# Patient Record
Sex: FEMALE | URBAN - METROPOLITAN AREA
[De-identification: names, ages, dates, MRNs, and addresses within clinical notes are randomized per-mention and may not be internally consistent; named-entity substitution may affect disease eponyms.]

---

## 2024-11-19 ENCOUNTER — TELEPHONE (OUTPATIENT)
Age: 74
End: 2024-11-19

## 2024-11-19 NOTE — TELEPHONE ENCOUNTER
Patient called, she was injured in [place] on 10/29/2024. She has a Left Shoulder Fx and was told it was non-surgical but that she will need therapy. She is still in a sling but is back home in [place] and is looking for an Ortho doctor to see here, preferably in [place]. She has some records from the ED in [place] and the images on a disc that she can bring with her.    Please review and advise patient, [phone]

## 2024-12-10 ENCOUNTER — OFFICE VISIT (OUTPATIENT)
Age: 74
End: 2024-12-10
Payer: MEDICARE

## 2024-12-10 VITALS — RESPIRATION RATE: 20 BRPM | HEIGHT: 62 IN | BODY MASS INDEX: 25.4 KG/M2 | WEIGHT: 138 LBS

## 2024-12-10 DIAGNOSIS — M25.512 LEFT SHOULDER PAIN, UNSPECIFIED CHRONICITY: Primary | ICD-10-CM

## 2024-12-10 DIAGNOSIS — S42.292A OTHER CLOSED DISPLACED FRACTURE OF PROXIMAL END OF LEFT HUMERUS, INITIAL ENCOUNTER: ICD-10-CM

## 2024-12-10 PROCEDURE — G8400 PT W/DXA NO RESULTS DOC: HCPCS | Performed by: ORTHOPAEDIC SURGERY

## 2024-12-10 PROCEDURE — 1036F TOBACCO NON-USER: CPT | Performed by: ORTHOPAEDIC SURGERY

## 2024-12-10 PROCEDURE — 3017F COLORECTAL CA SCREEN DOC REV: CPT | Performed by: ORTHOPAEDIC SURGERY

## 2024-12-10 PROCEDURE — G8427 DOCREV CUR MEDS BY ELIG CLIN: HCPCS | Performed by: ORTHOPAEDIC SURGERY

## 2024-12-10 PROCEDURE — 1125F AMNT PAIN NOTED PAIN PRSNT: CPT | Performed by: ORTHOPAEDIC SURGERY

## 2024-12-10 PROCEDURE — 1123F ACP DISCUSS/DSCN MKR DOCD: CPT | Performed by: ORTHOPAEDIC SURGERY

## 2024-12-10 PROCEDURE — 73030 X-RAY EXAM OF SHOULDER: CPT | Performed by: ORTHOPAEDIC SURGERY

## 2024-12-10 PROCEDURE — G8419 CALC BMI OUT NRM PARAM NOF/U: HCPCS | Performed by: ORTHOPAEDIC SURGERY

## 2024-12-10 PROCEDURE — G8484 FLU IMMUNIZE NO ADMIN: HCPCS | Performed by: ORTHOPAEDIC SURGERY

## 2024-12-10 PROCEDURE — 1159F MED LIST DOCD IN RCRD: CPT | Performed by: ORTHOPAEDIC SURGERY

## 2024-12-10 PROCEDURE — 99203 OFFICE O/P NEW LOW 30 MIN: CPT | Performed by: ORTHOPAEDIC SURGERY

## 2024-12-10 PROCEDURE — 1090F PRES/ABSN URINE INCON ASSESS: CPT | Performed by: ORTHOPAEDIC SURGERY

## 2024-12-10 PROCEDURE — 1160F RVW MEDS BY RX/DR IN RCRD: CPT | Performed by: ORTHOPAEDIC SURGERY

## 2024-12-10 NOTE — PROGRESS NOTES
Joanne Mills  1950   Chief Complaint   Patient presents with    Shoulder Pain     Left fx        HISTORY OF PRESENT ILLNESS  Joanne Mills is a 74 y.o. female who presents today for evaluation of left shoulder pain.  Pain is a 7/10. Pain has been present for a month and a half. She notes she slipped and fell in October 29th when she was in [place]. She notes her shoulder was not dislocated.  She has aching at times. She does not take any medication for the pain. She is wearing a sling. She engages in exercises to maintain mobility. Having little pain with movement.    Has tried following treatments: Injections:No; Brace:Yes; Therapy:No; Cane/Crutch:No      Not on File     History reviewed. No pertinent past medical history.   Social History    None        History reviewed. No pertinent surgical history.   History reviewed. No pertinent family history.  No current outpatient medications on file.     No current facility-administered medications for this visit.       REVIEW OF SYSTEM   Patient denies: Weight loss, Fever/Chills, HA, Visual changes, Fatigue, Chest pain, SOB, Abdominal pain, N/V/D/C, Blood in stool or urine, Edema.   Pertinent positive as above in HPI. All others were negative    PHYSICAL EXAM:   Resp 20   Ht 1.575 m (5' 2\")   Wt 62.6 kg (138 lb)   BMI 25.24 kg/m²   The patient is a well-developed, well-nourished female   in no acute distress.  The patient is alert and oriented times three.  The patient is alert and oriented times three. Mood and affect are normal.  LYMPHATIC: lymph nodes are not enlarged and are within normal limits  SKIN: normal in color and non tender to palpation. There are no bruises or abrasions noted.   NEUROLOGICAL: Motor sensory exam is within normal limits. Reflexes are equal bilaterally. There is normal sensation to pinprick and light touch  MUSCULOSKELETAL: Inspection mild swelling of the left shoulder with restricted range of motion glenohumeral

## 2024-12-11 ENCOUNTER — TELEPHONE (OUTPATIENT)
Age: 74
End: 2024-12-11

## 2024-12-11 DIAGNOSIS — M25.512 LEFT SHOULDER PAIN, UNSPECIFIED CHRONICITY: Primary | ICD-10-CM

## 2024-12-11 RX ORDER — CELECOXIB 200 MG/1
200 CAPSULE ORAL DAILY
Qty: 60 CAPSULE | Refills: 3 | Status: SHIPPED | OUTPATIENT
Start: 2024-12-11

## 2024-12-11 NOTE — TELEPHONE ENCOUNTER
Patient called in and ask if something for her painful shoulders be sent into her pharmacy    Please advise patient @ [phone]

## 2024-12-13 ENCOUNTER — HOSPITAL ENCOUNTER (OUTPATIENT)
Facility: HOSPITAL | Age: 74
Setting detail: RECURRING SERIES
Discharge: HOME | End: 2024-12-16
Payer: MEDICARE

## 2024-12-13 PROCEDURE — 97110 THERAPEUTIC EXERCISES: CPT

## 2024-12-13 PROCEDURE — 97535 SELF CARE MNGMENT TRAINING: CPT

## 2024-12-13 PROCEDURE — 97161 PT EVAL LOW COMPLEX 20 MIN: CPT

## 2024-12-13 NOTE — PROGRESS NOTES
PHYSICAL / OCCUPATIONAL THERAPY - DAILY TREATMENT NOTE (updated )    Patient Name: Joanne Mills    Date: 2024    : 1950  Insurance: Payor: MEDICARE / Plan: MEDICARE PART A AND B / Product Type: *No Product type* /      Patient  verified Yes     Visit #   Current / Total 1 24   Time   In / Out 9:40 10:20   Pain   In / Out 4 4   Subjective Functional Status/Changes: See POC     TREATMENT AREA =  Left shoulder pain [M25.512]  Other displaced fracture of upper end of left humerus, initial encounter for closed fracture [S42.292A]    OBJECTIVE    16 min   Eval - untimed                      Therapeutic Procedures:  Tx Min Billable or 1:1 Min (if diff from Tx Min) Procedure, Rationale, Specifics   12  58252 Therapeutic Exercise (timed):  increase ROM, strength, coordination, balance, and proprioception to improve patient's ability to progress to PLOF and address remaining functional goals. (see flow sheet as applicable)     Details if applicable:  HEP instruction and demonstration, PROM left shoulder flex/ABD/ER with oscillations and gentle overpressure in supine     12  70013 Self Care/Home Management (timed):  improve patient knowledge and understanding of home injury/symptom/pain management, positioning, posture/ergonomics, home safety, activity modification, transfer techniques, and joint protection strategies  to improve patient's ability to progress to PLOF and address remaining functional goals.  (see flow sheet as applicable)     Details if applicable:  pt education on relevant anatomy/physiology, pt education on ROM restrictions per physician order   24  [facility] Totals Reminder: bill using total billable min of TIMED therapeutic procedures (example: do not include dry needle or estim unattended, both untimed codes, in totals to left)  8-22 min = 1 unit; 23-37 min = 2 units; 38-52 min = 3 units; 53-67 min = 4 units; 68-82 min = 5 units   Total Total     TOTAL TREATMENT TIME:        40     [x]

## 2024-12-13 NOTE — PROGRESS NOTES
[facility] AT [facility]  [address] [place] [address] Phone: [phone] Fax [phone]  Plan of Care / Statement of Necessity for Physical Therapy Services     Patient Name: oJanne Mills : 1950   Treatment   Diagnosis: M25.512  LEFT SHOULDER PAIN Medical Diagnosis: Left shoulder pain [M25.512]  Other displaced fracture of upper end of left humerus, initial encounter for closed fracture [S42.292A]   Onset Date: 10/29/2024 Payor Source: Payor: MEDICARE / Plan: MEDICARE PART A AND B / Product Type: *No Product type* /    Referral Source: Mumtaz Jaime,* Start of Care (SOC): 2024   Prior Hospitalization: See medical history Provider #: [id number]   Prior Level of Function: Independent with ADLs, functional, and daily activities with no left UE pain.    Comorbidities: Musculoskeletal disorders and Other: arthritis, pre-DM, HTN, osteoporosis      Assessment / key information:    Pt is a 74 year old female who presents to therapy today with left shoulder pain. Pt states that her symptoms began on 10/29/2024 when she slipped and fell at a [employer] in [place]. She reports going to the ED after the incident, had xrays performed, which showed a proximal left humerus fracture. Pt reports having pain with lifting the left UE. She reports needing intermittent assistance from her  for IADLs and is unable to drive. She has difficulty sleeping and is unable to lay in her bed because of the pain. Pt demonstrated decreased AROM/PROM, impaired posture, and muscle tightness/tenderness to palpation. Pt would benefit from skilled physical therapy to improve the above impairments to help the pt restore function and return to performing ADLs, functional, and daily activities.     Evaluation Complexity:  History:  HIGH Complexity :3+ comorbidities / personal factors will impact the outcome/ POC ; Examination:  MEDIUM Complexity

## 2024-12-16 ENCOUNTER — HOSPITAL ENCOUNTER (OUTPATIENT)
Facility: HOSPITAL | Age: 74
Setting detail: RECURRING SERIES
Discharge: HOME | End: 2024-12-19
Payer: MEDICARE

## 2024-12-16 PROCEDURE — 97110 THERAPEUTIC EXERCISES: CPT

## 2024-12-16 PROCEDURE — 97140 MANUAL THERAPY 1/> REGIONS: CPT

## 2024-12-16 NOTE — PROGRESS NOTES
PHYSICAL / OCCUPATIONAL THERAPY - DAILY TREATMENT NOTE (updated )    Patient Name: Joanne Mills    Date: 2024    : 1950  Insurance: Payor: MEDICARE / Plan: MEDICARE PART A AND B / Product Type: *No Product type* /      Patient  verified Yes     Visit #   Current / Total 2 24   Time   In / Out 10:23 11:02   Pain   In / Out 5 5   Subjective Functional Status/Changes: Pt reports attempting HEP but does have some questions with the exercises.      TREATMENT AREA =  Left shoulder pain [M25.512]  Other displaced fracture of upper end of left humerus, initial encounter for closed fracture [S42.292A]    OBJECTIVE  Therapeutic Procedures:  Tx Min Billable or 1:1 Min (if diff from Tx Min) Procedure, Rationale, Specifics   27  45390 Therapeutic Exercise (timed):  increase ROM, strength, coordination, balance, and proprioception to improve patient's ability to progress to PLOF and address remaining functional goals. (see flow sheet as applicable)     Details if applicable:  exercises, HEP instruction     12  03626 Manual Therapy (timed):  decrease pain, increase ROM, increase tissue extensibility, and increase postural awareness to improve patient's ability to progress to PLOF and address remaining functional goals.  The manual therapy interventions were performed at a separate and distinct time from the therapeutic activities interventions . (see flow sheet as applicable)     Details if applicable: in supine: gentle left GHJ distraction, grade 2 left GHJ posterior mobs, PROM left shoulder flex/ABD/IR/ER with oscillations and gentle overpressure after performing above.    39  [facility] Totals Reminder: bill using total billable min of TIMED therapeutic procedures (example: do not include dry needle or estim unattended, both untimed codes, in totals to left)  8-22 min = 1 unit; 23-37 min = 2 units; 38-52 min = 3 units; 53-67 min = 4 units; 68-82 min = 5 units   Total Total     TOTAL TREATMENT TIME:        39

## 2024-12-18 ENCOUNTER — HOSPITAL ENCOUNTER (OUTPATIENT)
Facility: HOSPITAL | Age: 74
Setting detail: RECURRING SERIES
Discharge: HOME | End: 2024-12-21
Payer: MEDICARE

## 2024-12-18 PROCEDURE — 97110 THERAPEUTIC EXERCISES: CPT

## 2024-12-18 PROCEDURE — 97140 MANUAL THERAPY 1/> REGIONS: CPT

## 2024-12-18 NOTE — PROGRESS NOTES
PHYSICAL / OCCUPATIONAL THERAPY - DAILY TREATMENT NOTE    Patient Name: Joanne Mills    Date: 2024    : 1950  Insurance: Payor: MEDICARE / Plan: MEDICARE PART A AND B / Product Type: *No Product type* /      Patient  verified Yes     Visit #   Current / Total 3 24   Time   In / Out 10:30 11:05   Pain   In / Out 5 2   Subjective Functional Status/Changes: I think I'm doing pretty good with ROM     TREATMENT AREA =  Left shoulder pain [M25.512]  Other displaced fracture of upper end of left humerus, initial encounter for closed fracture [S42.292A]     OBJECTIVE        Therapeutic Procedures:    Tx Min Billable or 1:1 Min (if diff from Tx Min) Procedure, Rationale, Specifics    11419 Manual Therapy (timed):  decrease pain and increase ROM to improve patient's ability to progress to PLOF and address remaining functional goals.  The manual therapy interventions were performed at a separate and distinct time from the therapeutic activities interventions . (see flow sheet as applicable)     Details if applicable:        46548 Therapeutic Exercise (timed):  increase ROM, strength, coordination, balance, and proprioception to improve patient's ability to progress to PLOF and address remaining functional goals. (see flow sheet as applicable)     Details if applicable:            Details if applicable:            Details if applicable:            Details if applicable:     35 35 [facility] Totals Reminder: bill using total billable min of TIMED therapeutic procedures (example: do not include dry needle or estim unattended, both untimed codes, in totals to left)  8-22 min = 1 unit; 23-37 min = 2 units; 38-52 min = 3 units; 53-67 min = 4 units; 68-82 min = 5 units   Total Total     [x]  Patient Education billed concurrently with other procedures   [x] Review HEP    [] Progressed/Changed HEP, detail:    [] Other detail:       Objective Information/Functional Measures/Assessment    Patient seen today to

## 2024-12-31 ENCOUNTER — HOSPITAL ENCOUNTER (OUTPATIENT)
Facility: HOSPITAL | Age: 74
Setting detail: RECURRING SERIES
Discharge: HOME | End: 2025-01-03
Payer: MEDICARE

## 2024-12-31 PROCEDURE — 97140 MANUAL THERAPY 1/> REGIONS: CPT

## 2024-12-31 PROCEDURE — 97110 THERAPEUTIC EXERCISES: CPT

## 2024-12-31 NOTE — PROGRESS NOTES
be accomplished in 24 treatments  Pt will improve her QuickDASH score to 40% or less to improve ability to perform ADLs.  Status at last note/certification: 63.6%  2.  Pt will improve left functional IR to sacrum, left functional ER to occiput to improve independence with IADLs.  Status at last note/certification: not tested currently due to pain levels and AROM limitations, needs assistance for IADLs due to limited ROM and pin.   3.  Pt will increase AROM left shoulder flex/ABD to at least 120 degs to improve ability to reach overhead with less pain.  Status at last note/certification: flex 43 degs in sitting, ABD 45 degs in sitting  4.  Pt will report being able to sleep in her bed with minimal to no increased left shoulder pain to improve sleeping tolerance.  Status at last note/certification: unable to sleep in her bed due to pain.      Next PN/ RC due 1/10/2025  Auth due (visit number/ date) BERNADETTE    PLAN  - Continue Plan of Care    Elroy Concepcion, ERICA    12/31/2024    7:43 AM  If an interpreting service was utilized for treatment of this patient, the contents of this document represent the material reviewed with the patient via the .     Future Appointments   Date Time Provider Department Center   1/3/2025  7:40 AM Tejal Vance, PT [facility]   1/7/2025 10:20 AM Mumtaz Jaime MD [facility]

## 2025-01-03 ENCOUNTER — HOSPITAL ENCOUNTER (OUTPATIENT)
Facility: HOSPITAL | Age: 75
Setting detail: RECURRING SERIES
Discharge: HOME | End: 2025-01-06
Payer: MEDICARE

## 2025-01-03 PROCEDURE — 97140 MANUAL THERAPY 1/> REGIONS: CPT

## 2025-01-03 PROCEDURE — 97110 THERAPEUTIC EXERCISES: CPT

## 2025-01-03 NOTE — PROGRESS NOTES
PHYSICAL / OCCUPATIONAL THERAPY - DAILY TREATMENT NOTE    Patient Name: Joanne Mills    Date: 1/3/2025    : 1950  Insurance: Payor: MEDICARE / Plan: MEDICARE PART A AND B / Product Type: *No Product type* /      Patient  verified Yes     Visit #   Current / Total 5 24   Time   In / Out 7:40 8:32   Pain   In / Out 4 7   Subjective Functional Status/Changes: Pt reports that she's having some pain with the home exercises.     TREATMENT AREA =  Left shoulder pain [M25.512]  Other displaced fracture of upper end of left humerus, initial encounter for closed fracture [S42.292A]     OBJECTIVE    Modalities Rationale:     decrease pain to improve patient's ability to progress to PLOF and address remaining functional goals.    10 min  unbill [x]  Ice     []  Heat    location/position: Left shoulder/supine   Skin assessment post-treatment:   Redness, no adverse reactions       Therapeutic Procedures:    Tx Min Billable or 1:1 Min (if diff from Tx Min) Procedure, Rationale, Specifics   27 27 70780 Therapeutic Exercise (timed):  increase ROM, strength, coordination, balance, and proprioception to improve patient's ability to progress to PLOF and address remaining functional goals. (see flow sheet as applicable)     Details if applicable:       15 15 59132 Manual Therapy (timed):  decrease pain, increase ROM, and increase tissue extensibility to improve patient's ability to progress to PLOF and address remaining functional goals.  The manual therapy interventions were performed at a separate and distinct time from the therapeutic activities interventions . (see flow sheet as applicable)     Details if applicable:  Supine: STM/DTM biceps/deltoid/triceps/supraspinatus/pectoralis, pin and stretch pectoralis, PROM ABD/ER/flex/horiz add/scap; Right Side lying: scapular mobilizations all directions, STM/DTM infraspinatus/supraspinaus/          Details if applicable:            Details if applicable:            Details

## 2025-01-07 ENCOUNTER — OFFICE VISIT (OUTPATIENT)
Age: 75
End: 2025-01-07
Payer: MEDICARE

## 2025-01-07 VITALS — WEIGHT: 138 LBS | HEIGHT: 62 IN | BODY MASS INDEX: 25.4 KG/M2

## 2025-01-07 DIAGNOSIS — S42.292A OTHER CLOSED DISPLACED FRACTURE OF PROXIMAL END OF LEFT HUMERUS, INITIAL ENCOUNTER: Primary | ICD-10-CM

## 2025-01-07 PROCEDURE — G8427 DOCREV CUR MEDS BY ELIG CLIN: HCPCS | Performed by: ORTHOPAEDIC SURGERY

## 2025-01-07 PROCEDURE — G8419 CALC BMI OUT NRM PARAM NOF/U: HCPCS | Performed by: ORTHOPAEDIC SURGERY

## 2025-01-07 PROCEDURE — 3017F COLORECTAL CA SCREEN DOC REV: CPT | Performed by: ORTHOPAEDIC SURGERY

## 2025-01-07 PROCEDURE — G8400 PT W/DXA NO RESULTS DOC: HCPCS | Performed by: ORTHOPAEDIC SURGERY

## 2025-01-07 PROCEDURE — 1090F PRES/ABSN URINE INCON ASSESS: CPT | Performed by: ORTHOPAEDIC SURGERY

## 2025-01-07 PROCEDURE — M1308 PR FLU IMMUNIZE NO ADMIN: HCPCS | Performed by: ORTHOPAEDIC SURGERY

## 2025-01-07 PROCEDURE — 1036F TOBACCO NON-USER: CPT | Performed by: ORTHOPAEDIC SURGERY

## 2025-01-07 PROCEDURE — 1159F MED LIST DOCD IN RCRD: CPT | Performed by: ORTHOPAEDIC SURGERY

## 2025-01-07 PROCEDURE — 99212 OFFICE O/P EST SF 10 MIN: CPT | Performed by: ORTHOPAEDIC SURGERY

## 2025-01-07 PROCEDURE — 1125F AMNT PAIN NOTED PAIN PRSNT: CPT | Performed by: ORTHOPAEDIC SURGERY

## 2025-01-07 PROCEDURE — 1160F RVW MEDS BY RX/DR IN RCRD: CPT | Performed by: ORTHOPAEDIC SURGERY

## 2025-01-07 PROCEDURE — 1123F ACP DISCUSS/DSCN MKR DOCD: CPT | Performed by: ORTHOPAEDIC SURGERY

## 2025-01-07 NOTE — PROGRESS NOTES
Joanne Mills  1950   Chief Complaint   Patient presents with    Shoulder Pain     Lt         HISTORY OF PRESENT ILLNESS  Joanne Mills is a 74 y.o. female who presents today for reevaluation of left shoulder pain. Pain is a 4/10. Her ROM has increased. She is compliant with PT. Overall she is doing well since her initial injury in October.     Patient denies any fever, chills, chest pain, shortness of breath or calf pain. The remainder of the review of systems is negative. There are no new illness or injuries other than that mentioned above to report since last seen in the office. No changes in medications, allergies, social or family history.      PHYSICAL EXAM:   Ht 1.575 m (5' 2\")   Wt 62.6 kg (138 lb)   BMI 25.24 kg/m²   The patient is a well-developed, well-nourished female   in no acute distress.  The patient is alert and oriented times three.  The patient is alert and oriented times three. Mood and affect are normal.  LYMPHATIC: lymph nodes are not enlarged and are within normal limits  SKIN: normal in color and non tender to palpation. There are no bruises or abrasions noted.   NEUROLOGICAL: Motor sensory exam is within normal limits. Reflexes are equal bilaterally. There is normal sensation to pinprick and light touch  MUSCULOSKELETAL: Active abduction of the left shoulder has improved to about 120 degrees    PROCEDURE: none    IMAGING: XR of the left shoulder with 3 views obtained in office on 12/10/2024 reviewed and read by : Proximal humerus fracture with angulation and slight callus formation noted     IMPRESSION:      ICD-10-CM    1. Other closed displaced fracture of proximal end of left humerus, initial encounter  S42.292A            PLAN:   1. Pt presents today with left shoulder pain secondary to proximal humerus fracture. At this time I will have her continue PT for active and passive ROM exercises.     2. No cortisone injection indicated today   3. No

## 2025-01-10 ENCOUNTER — HOSPITAL ENCOUNTER (OUTPATIENT)
Facility: HOSPITAL | Age: 75
Setting detail: RECURRING SERIES
Discharge: HOME | End: 2025-01-13
Payer: MEDICARE

## 2025-01-10 PROCEDURE — 97530 THERAPEUTIC ACTIVITIES: CPT

## 2025-01-10 PROCEDURE — 97110 THERAPEUTIC EXERCISES: CPT

## 2025-01-10 NOTE — PROGRESS NOTES
PHYSICAL / OCCUPATIONAL THERAPY - DAILY TREATMENT NOTE    Patient Name: Joanne Mills    Date: 1/10/2025    : 1950  Insurance: Payor: MEDICARE / Plan: MEDICARE PART A AND B / Product Type: *No Product type* /      Patient  verified Yes     Visit #   Current / Total 6 24   Time   In / Out 10:21 10:59   Pain   In / Out 3 2   Subjective Functional Status/Changes: Pt reports that she has been told by referring MD that she is healing well and is not required to return for any further follow up unless problems arise.     TREATMENT AREA =  Left shoulder pain [M25.512]  Other displaced fracture of upper end of left humerus, initial encounter for closed fracture [S42.292A]     OBJECTIVE         Therapeutic Procedures:    Tx Min Billable or 1:1 Min (if diff from Tx Min) Procedure, Rationale, Specifics   23 23 76116 Therapeutic Exercise (timed):  increase ROM, strength, coordination, balance, and proprioception to improve patient's ability to progress to PLOF and address remaining functional goals. (see flow sheet as applicable)     Details if applicable:       15 15 83233 Therapeutic Activity (timed):  use of dynamic activities replicating functional movements to increase ROM, strength, coordination, balance, and proprioception in order to improve patient's ability to progress to PLOF and address remaining functional goals.  (see flow sheet as applicable)     Details if applicable:  includes reassessment time          Details if applicable:            Details if applicable:            Details if applicable:     38 38 [facility] Totals Reminder: bill using total billable min of TIMED therapeutic procedures (example: do not include dry needle or estim unattended, both untimed codes, in totals to left)  8-22 min = 1 unit; 23-37 min = 2 units; 38-52 min = 3 units; 53-67 min = 4 units; 68-82 min = 5 units   Total Total     [x]  Patient Education billed concurrently with other procedures   [x] Review HEP    [x]

## 2025-01-10 NOTE — PROGRESS NOTES
[facility] AT [facility]   [address], [place] [address]  Phone: [phone] Fax: [phone]  PROGRESS NOTE  Patient Name: Joanne Mills : 1950   Treatment/Medical Diagnosis: Left shoulder pain [M25.512]  Other displaced fracture of upper end of left humerus, initial encounter for closed fracture [S42.703A]   Referral Source: Mumtaz Jaime,*     Payor: Payor: MEDICARE / Plan: MEDICARE PART A AND B / Product Type: *No Product type* /            Date of Initial Visit: 24 Attended Visits: 6 Missed Visits: 0       CURRENT GOAL STATUS  Short Term Goals: To be accomplished in 8 treatments    Pt will report compliance and independence to HEP to help the pt manage their pain and symptoms.  Status at last note/certification:  established  Current: Met - reports daily HEP performance (01/10/25)  2.     Pt will improve PROM left shoulder flex to 130 degs, ABD to at least 100 degs to improve ability to progress toward improved AROM.   Status at last note/certification: flex 90 degs, ABD 55 degs  Current: Met - PROM flex = 135 degrees, abd = 115 degrees (01/10/25)  3.     Pt will improve PROM left shoulder ER to 70 degs (in a scaption plane) to improve ability to tolerate activity progression.   Status at last note/certification: ER 55 degs in a scaption plane  Current: Progressing - PROM ER in scaption = 58 degrees (01/10/25)     Long Term Goals: To be accomplished in 24 treatments  Pt will improve her QuickDASH score to 40% or less to improve ability to perform ADLs.  Status at last note/certification: 63.6%  Current: Progressing - QuickDASH = 52.3% (01/10/25)  2.  Pt will improve left functional IR to sacrum, left functional ER to occiput to improve independence with IADLs.  Status at last note/certification: not tested currently due to pain levels and AROM limitations, needs assistance for IADLs due to limited ROM and pin.

## 2025-01-14 ENCOUNTER — HOSPITAL ENCOUNTER (OUTPATIENT)
Facility: HOSPITAL | Age: 75
Setting detail: RECURRING SERIES
Discharge: HOME | End: 2025-01-17
Payer: MEDICARE

## 2025-01-14 PROCEDURE — 97140 MANUAL THERAPY 1/> REGIONS: CPT

## 2025-01-14 PROCEDURE — 97110 THERAPEUTIC EXERCISES: CPT

## 2025-01-14 NOTE — PROGRESS NOTES
tolerance.  Status at last note/certification: Progressing - pt able to sleep in her bed but reports still not able to sleep on left shoulder without increased pain (01/10/25)  Current:     Next PN/ RC due 2/9/2025  Auth due (visit number/ date) none    PLAN  - Continue Plan of Care    Stella Be PTA    1/14/2025    9:06 AM  If an interpreting service was utilized for treatment of this patient, the contents of this document represent the material reviewed with the patient via the .     Future Appointments   Date Time Provider Department Center   1/16/2025  9:00 AM STELLA [facility]

## 2025-01-16 ENCOUNTER — HOSPITAL ENCOUNTER (OUTPATIENT)
Facility: HOSPITAL | Age: 75
Setting detail: RECURRING SERIES
Discharge: HOME | End: 2025-01-19
Payer: MEDICARE

## 2025-01-16 PROCEDURE — 97110 THERAPEUTIC EXERCISES: CPT

## 2025-01-16 PROCEDURE — 97530 THERAPEUTIC ACTIVITIES: CPT

## 2025-01-16 PROCEDURE — 97140 MANUAL THERAPY 1/> REGIONS: CPT

## 2025-01-16 NOTE — PROGRESS NOTES
IADLs.  Status at last note/certification: Progressing - functional IR = L4, Functional ER = occiput with cervical lateral bend (01/10/25)   Current:   4.  Pt will increase AROM left shoulder flex/ABD to at least 120 degs to improve ability to reach overhead with less pain.  Status at last note/certification: Progressing - AROM flex = 108 degrees, abd = 90 degrees, measured in supine (01/10/25)  Current:  flexion 85 degrees, abduction 65 degrees both in absence of shoulder hike  (1/16/2025)  5.  Pt will report being able to sleep in her bed with minimal to no increased left shoulder pain to improve sleeping tolerance.  Status at last note/certification: Progressing - pt able to sleep in her bed but reports still not able to sleep on left shoulder without increased pain (01/10/25)  Current:  not met: still unable to sleep on the left side. Typically awakens at 4 AM  (1/16/2025)    Next PN/ RC due  2/9/2025   Auth due (visit number/ date) none    PLAN  - Continue Plan of Care    Chantal Be PTA    1/16/2025    9:14 AM  If an interpreting service was utilized for treatment of this patient, the contents of this document represent the material reviewed with the patient via the .     No future appointments.

## 2025-01-20 ENCOUNTER — TELEPHONE (OUTPATIENT)
Age: 75
End: 2025-01-20

## 2025-01-20 NOTE — TELEPHONE ENCOUNTER
Pt returned Mindy's phone call regarding the disc that was remade for pt. Pt would like for the information and also the disc to be mailed to her home address that is on her file.    [address]   [facility] [facility] [place] [facility] [address]     callback # [phone]

## 2025-01-23 ENCOUNTER — HOSPITAL ENCOUNTER (OUTPATIENT)
Facility: HOSPITAL | Age: 75
Setting detail: RECURRING SERIES
Discharge: HOME | End: 2025-01-26
Payer: MEDICARE

## 2025-01-23 PROCEDURE — 97110 THERAPEUTIC EXERCISES: CPT

## 2025-01-23 PROCEDURE — 97140 MANUAL THERAPY 1/> REGIONS: CPT

## 2025-01-23 NOTE — PROGRESS NOTES
PHYSICAL / OCCUPATIONAL THERAPY - DAILY TREATMENT NOTE    Patient Name: Joanne Mills    Date: 2025    : 1950  Insurance: Payor: MEDICARE / Plan: MEDICARE PART A AND B / Product Type: *No Product type* /      Patient  verified Yes     Visit #   Current / Total 3 18   Time   In / Out 9:42 10:20   Pain   In / Out 3/10 4/10   Subjective Functional Status/Changes: The pain isn't bothering me too much, it's the fact I still can't raise my arm over my head.      TREATMENT AREA =  Left shoulder pain [M25.512]  Other displaced fracture of upper end of left humerus, initial encounter for closed fracture [S42.292A]     OBJECTIVE         Therapeutic Procedures:    Tx Min Billable or 1:1 Min (if diff from Tx Min) Procedure, Rationale, Specifics   12 12 58696 Manual Therapy (timed):  decrease pain, increase ROM, increase tissue extensibility, and decrease trigger points to improve patient's ability to progress to PLOF and address remaining functional goals.  The manual therapy interventions were performed at a separate and distinct time from the therapeutic activities interventions . (see flow sheet as applicable)     Details if applicable:  supine: PROM shoulder flexion/ abduction with gentle inferior glenohumeral inferior glide. Subscapular release. PROM  left shoulder ER      26 26 10769 Therapeutic Exercise (timed):  increase ROM, strength, coordination, balance, and proprioception to improve patient's ability to progress to PLOF and address remaining functional goals. (see flow sheet as applicable)     Details if applicable:                    38 38 [facility] Totals Reminder: bill using total billable min of TIMED therapeutic procedures (example: do not include dry needle or estim unattended, both untimed codes, in totals to left)  8-22 min = 1 unit; 23-37 min = 2 units; 38-52 min = 3 units; 53-67 min = 4 units; 68-82 min = 5 units   Total Total     [x]  Patient Education billed concurrently with other

## 2025-01-30 ENCOUNTER — HOSPITAL ENCOUNTER (OUTPATIENT)
Facility: HOSPITAL | Age: 75
Setting detail: RECURRING SERIES
End: 2025-01-30
Payer: MEDICARE

## 2025-01-30 PROCEDURE — 97140 MANUAL THERAPY 1/> REGIONS: CPT

## 2025-01-30 PROCEDURE — 97110 THERAPEUTIC EXERCISES: CPT

## 2025-01-30 NOTE — PROGRESS NOTES
PHYSICAL / OCCUPATIONAL THERAPY - DAILY TREATMENT NOTE    Patient Name: Joanne Mills    Date: 2025    : 1950  Insurance: Payor: MEDICARE / Plan: MEDICARE PART A AND B / Product Type: *No Product type* /      Patient  verified Yes     Visit #   Current / Total 4 18   Time   In / Out 9:41 10:31   Pain   In / Out 5/10 2/10   Subjective Functional Status/Changes: I'm very sore, I might have slept on my shoulder wrong.      TREATMENT AREA =  Left shoulder pain [M25.512]  Other displaced fracture of upper end of left humerus, initial encounter for closed fracture [S42.932A]     OBJECTIVE    Modalities Rationale:     decrease pain and increase tissue extensibility to improve patient's ability to progress to PLOF and address remaining functional goals.     min [] Estim Unattended, type/location:                                      []  w/ice    []  w/heat    min [] Estim Attended, type/location:                                     []  w/US     []  w/ice    []  w/heat    []  TENS insruct      min []  Mechanical Traction: type/lbs                   []  pro   []  sup   []  int   []  cont    []  before manual    []  after manual    min []  Ultrasound, settings/location:     10 min  unbill [x]  Ice     []  Heat    location/position: Seated: left shoulder.     min []  Paraffin,  details:     min []  Vasopneumatic Device, press/temp:     min []  Whirlpool / Fluido:    If using vaso (only need to measure limb vaso being performed on)      pre-treatment girth :       post-treatment girth :       measured at (landmark location) :      min []  Other:    Skin assessment post-treatment:   Redness, no adverse reactions      Therapeutic Procedures:    Tx Min Billable or 1:1 Min (if diff from Tx Min) Procedure, Rationale, Specifics   10 10 68344 Manual Therapy (timed):  decrease pain, increase ROM, increase tissue extensibility, and decrease trigger points to improve patient's ability to progress to PLOF and address  remaining functional goals.  The manual therapy interventions were performed at a separate and distinct time from the therapeutic activities interventions . (see flow sheet as applicable)     Details if applicable:  PROM left shoulder flexion, abduction. STM to the left anterior deltoid, pec major, subscapular release, and biceps.      30 30 76347 Therapeutic Exercise (timed):  increase ROM, strength, coordination, balance, and proprioception to improve patient's ability to progress to PLOF and address remaining functional goals. (see flow sheet as applicable)     Details if applicable:                    40 40 [facility] Totals Reminder: bill using total billable min of TIMED therapeutic procedures (example: do not include dry needle or estim unattended, both untimed codes, in totals to left)  8-22 min = 1 unit; 23-37 min = 2 units; 38-52 min = 3 units; 53-67 min = 4 units; 68-82 min = 5 units   Total Total     [x]  Patient Education billed concurrently with other procedures   [x] Review HEP    [] Progressed/Changed HEP, detail:    [] Other detail:       Objective Information/Functional Measures/Assessment    Pt reports to skilled therapy session with decreased functional strength and ROM in the left UE following a proximal humeral fracture. Pt tolerated the addition of functional ER/ IR shoulder stretch with strap to improve ease with reaching behind her head/ back. Pt has achieved one of her long term goals and increased both her functional shoulder ER/ IR measurements. Pt able to improve repetition volume during thera-band rows and extension noting only minor soreness in the left shoulder. Pt performed open cans x 3 in front of mirror to limit shoulder hiking. Session tolerated well.     Patient will continue to benefit from skilled PT / OT services to modify and progress therapeutic interventions, analyze and address functional mobility deficits, analyze and address ROM deficits, analyze and address strength

## 2025-02-06 ENCOUNTER — HOSPITAL ENCOUNTER (OUTPATIENT)
Facility: HOSPITAL | Age: 75
Setting detail: RECURRING SERIES
Discharge: HOME | End: 2025-02-09
Payer: MEDICARE

## 2025-02-06 PROCEDURE — 97530 THERAPEUTIC ACTIVITIES: CPT

## 2025-02-06 PROCEDURE — 97110 THERAPEUTIC EXERCISES: CPT

## 2025-02-06 PROCEDURE — 97140 MANUAL THERAPY 1/> REGIONS: CPT

## 2025-02-06 NOTE — PROGRESS NOTES
PHYSICAL / OCCUPATIONAL THERAPY - DAILY TREATMENT NOTE    Patient Name: Joanne Mills    Date: 2025    : 1950  Insurance: Payor: MEDICARE / Plan: MEDICARE PART A AND B / Product Type: *No Product type* /      Patient  verified Yes     Visit #   Current / Total 5 18   Time   In / Out 9:35 10:16   Pain   In / Out 2/10 3/10   Subjective Functional Status/Changes: I have a little soreness in left shoulder but I can self massage it out and it helps.      TREATMENT AREA =  Left shoulder pain [M25.512]  Other displaced fracture of upper end of left humerus, initial encounter for closed fracture [S42.292A]     OBJECTIVE         Therapeutic Procedures:    Tx Min Billable or 1:1 Min (if diff from Tx Min) Procedure, Rationale, Specifics   10 10 44992 Manual Therapy (timed):  decrease pain, increase ROM, increase tissue extensibility, and decrease trigger points to improve patient's ability to progress to PLOF and address remaining functional goals.  The manual therapy interventions were performed at a separate and distinct time from the therapeutic activities interventions . (see flow sheet as applicable)     Details if applicable:   PROM left shoulder flexion, abduction. STM to the left anterior deltoid, pec major, subscapular release, and biceps      15 15 42779 Therapeutic Activity (timed):  use of dynamic activities replicating functional movements to increase ROM, strength, coordination, balance, and proprioception in order to improve patient's ability to progress to PLOF and address remaining functional goals.  (see flow sheet as applicable)     Details if applicable:  to include goal assessment.    16 16 28851 Therapeutic Exercise (timed):  increase ROM, strength, coordination, balance, and proprioception to improve patient's ability to progress to PLOF and address remaining functional goals. (see flow sheet as applicable)     Details if applicable:               41 41 [facility] Totals Reminder: bill using 
increased left shoulder pain to improve sleeping tolerance.  Status at last note/certification: Progressing - pt able to sleep in her bed but reports still not able to sleep on left shoulder without increased pain (01/10/25)  Current: progressing: unable to lay on her left side: denies any pain when laying on back. (2/6/2025)    SUMMARY OF TREATMENT  Pt has attended skilled physical therapy for 11 visits to address Left shoulder pain [M25.512]  Other displaced fracture of upper end of left humerus, initial encounter for closed fracture [S42.292A] and has made steady progress thus far with therapy measures.  Objectively, Pt demonstrates improvements in functional UE ROM.  Pt's functional gains include greater ease with preparing meals/ light household chores, and decreased symptoms with car transfers.  Pt's functional deficits include difficulty getting in and out of the bath tub, donning/ doffing socks as well as inability to reach top shelves.  Pt rates pain as 3/10 at worst and 0/10 at best, with the majority of her pain being on the lateral aspect of her left shoulder.  Pt reports a self-reported improvement rating of 70% since start of care. She gives a self reported goal of wanting to \"raise my arm up higher and get back to my exercise routine\".  Pt would continue to benefit from skilled therapy services to address functional deficits.      Medicare, cannot change goals, cannot adjust frequency/duration, no signature required   Reporting Period: (date from last Prog Note/Eval to current Prog Note/Recert)  1/10/2025 - 2/6/2025    Goal to be achieved in 13 treatments:  Pt will improve PROM left shoulder ER to 70 degs (in a scaption plane) to improve ability to tolerate activity progression.   Status at last note/certification:progressing: in 45 degrees abduction: 62 degrees of ER. (2/6/2025)  Current:   2.  Pt will increase AROM left shoulder flex/ABD to at least 120 degs to improve ability to reach overhead with

## 2025-02-11 ENCOUNTER — HOSPITAL ENCOUNTER (OUTPATIENT)
Facility: HOSPITAL | Age: 75
Setting detail: RECURRING SERIES
Discharge: HOME | End: 2025-02-14
Payer: MEDICARE

## 2025-02-11 PROCEDURE — 97112 NEUROMUSCULAR REEDUCATION: CPT

## 2025-02-11 PROCEDURE — 97110 THERAPEUTIC EXERCISES: CPT

## 2025-02-11 NOTE — TELEPHONE ENCOUNTER
Patient called again regarding the MRI disc that should be at the office for her. She is asking if it can be mailed to her, but is willing to pick it up next wekk sometime if mailing is not an option.     Please review and advise patient, [phone]

## 2025-02-11 NOTE — PROGRESS NOTES
PHYSICAL / OCCUPATIONAL THERAPY - DAILY TREATMENT NOTE    Patient Name: Joanne Mills    Date: 2025    : 1950  Insurance: Payor: MEDICARE / Plan: MEDICARE PART A AND B / Product Type: *No Product type* /      Patient  verified Yes     Visit #   Current / Total 1 13   Time   In / Out 10:21 10:59   Pain   In / Out 1/10 2/10   Subjective Functional Status/Changes: I only really get pain when I sleep on that side      TREATMENT AREA =  Left shoulder pain [M25.512]  Other displaced fracture of upper end of left humerus, initial encounter for closed fracture [S42.292A]     OBJECTIVE         Therapeutic Procedures:    Tx Min Billable or 1:1 Min (if diff from Tx Min) Procedure, Rationale, Specifics   13 13 39177 Neuromuscular Re-Education (timed):  improve balance, coordination, kinesthetic sense, posture, core stability and proprioception to improve patient's ability to develop conscious control of individual muscles and awareness of position of extremities in order to progress to PLOF and address remaining functional goals. (see flow sheet as applicable)     Details if applicable:             87590 Therapeutic Exercise (timed):  increase ROM, strength, coordination, balance, and proprioception to improve patient's ability to progress to PLOF and address remaining functional goals. (see flow sheet as applicable)     Details if applicable:               38 38 [facility] Totals Reminder: bill using total billable min of TIMED therapeutic procedures (example: do not include dry needle or estim unattended, both untimed codes, in totals to left)  8-22 min = 1 unit; 23-37 min = 2 units; 38-52 min = 3 units; 53-67 min = 4 units; 68-82 min = 5 units   Total Total     [x]  Patient Education billed concurrently with other procedures   [x] Review HEP    [] Progressed/Changed HEP, detail:    [] Other detail:       Objective Information/Functional Measures/Assessment    Pt reports to skilled therapy session with

## 2025-02-13 ENCOUNTER — HOSPITAL ENCOUNTER (OUTPATIENT)
Facility: HOSPITAL | Age: 75
Setting detail: RECURRING SERIES
Discharge: HOME | End: 2025-02-16
Payer: MEDICARE

## 2025-02-13 PROCEDURE — 97530 THERAPEUTIC ACTIVITIES: CPT

## 2025-02-13 PROCEDURE — 97110 THERAPEUTIC EXERCISES: CPT

## 2025-02-13 PROCEDURE — 97140 MANUAL THERAPY 1/> REGIONS: CPT

## 2025-02-13 NOTE — PROGRESS NOTES
left)  8-22 min = 1 unit; 23-37 min = 2 units; 38-52 min = 3 units; 53-67 min = 4 units; 68-82 min = 5 units   Total Total     [x]  Patient Education billed concurrently with other procedures   [x] Review HEP    [] Progressed/Changed HEP, detail:    [] Other detail:       Objective Information/Functional Measures/Assessment    Pt reported mild pain/soreness prior to today's session. Continued with UE strengthening with introduction to serratus anterior strengthening for ease with overhead lifting - verbal cues provided to decrease compensations and proper form throughout exercises. Manual intervention provided to decrease referred pain into anterior arm. Pt declined modalities at end of session.     Patient will continue to benefit from skilled PT / OT services to modify and progress therapeutic interventions, analyze and address functional mobility deficits, analyze and address ROM deficits, analyze and address strength deficits, analyze and address soft tissue restrictions, analyze and cue for proper movement patterns, and analyze and modify for postural abnormalities to address functional deficits and attain remaining goals.     Progress toward goals / Updated goals:  []  See Progress Note/Recertification     Goal to be achieved in 13 treatments:  Pt will improve PROM left shoulder ER to 70 degs (in a scaption plane) to improve ability to tolerate activity progression.   Status at last note/certification:progressing: in 45 degrees abduction: 62 degrees of ER. (2/6/2025)  Current:     2.  Pt will increase AROM left shoulder flex/ABD to at least 120 degs to improve ability to reach overhead with less pain.  Status at last note/certification: progressing: in sitting. flexion 103 degrees, abduction 85 degrees both in absence of shoulder hike. In supine: 125 degrees flexion, 113 degrees abduction.  (2/6/2025)  Current: progressing: In supine: 135 degrees flexion, 118 degrees abduction. (2/11/2025)     3.  Pt will

## 2025-02-18 ENCOUNTER — HOSPITAL ENCOUNTER (OUTPATIENT)
Facility: HOSPITAL | Age: 75
Setting detail: RECURRING SERIES
Discharge: HOME | End: 2025-02-21
Payer: MEDICARE

## 2025-02-18 PROCEDURE — 97110 THERAPEUTIC EXERCISES: CPT

## 2025-02-18 PROCEDURE — 97112 NEUROMUSCULAR REEDUCATION: CPT

## 2025-02-18 NOTE — PROGRESS NOTES
PHYSICAL / OCCUPATIONAL THERAPY - DAILY TREATMENT NOTE    Patient Name: Joanne Mills    Date: 2025    : 1950  Insurance: Payor: MEDICARE / Plan: MEDICARE PART A AND B / Product Type: *No Product type* /      Patient  verified Yes     Visit #   Current / Total 3 18   Time   In / Out 10:22 11:00   Pain   In / Out 2/10 2/  10   Subjective Functional Status/Changes: I would like to discharge by next week. I think an updated home program would be beneficial.      TREATMENT AREA =  Left shoulder pain [M25.512]  Other displaced fracture of upper end of left humerus, initial encounter for closed fracture [S42.292A]     OBJECTIVE         Therapeutic Procedures:    Tx Min Billable or 1:1 Min (if diff from Tx Min) Procedure, Rationale, Specifics   15 15 24527 Neuromuscular Re-Education (timed):  improve balance, coordination, kinesthetic sense, posture, core stability and proprioception to improve patient's ability to develop conscious control of individual muscles and awareness of position of extremities in order to progress to PLOF and address remaining functional goals. (see flow sheet as applicable)     Details if applicable:        70087 Therapeutic Exercise (timed):  increase ROM, strength, coordination, balance, and proprioception to improve patient's ability to progress to PLOF and address remaining functional goals. (see flow sheet as applicable)     Details if applicable:                    38 38 [facility] Totals Reminder: bill using total billable min of TIMED therapeutic procedures (example: do not include dry needle or estim unattended, both untimed codes, in totals to left)  8-22 min = 1 unit; 23-37 min = 2 units; 38-52 min = 3 units; 53-67 min = 4 units; 68-82 min = 5 units   Total Total     [x]  Patient Education billed concurrently with other procedures   [x] Review HEP    [] Progressed/Changed HEP, detail:    [] Other detail:       Objective Information/Functional

## 2025-02-20 ENCOUNTER — APPOINTMENT (OUTPATIENT)
Facility: HOSPITAL | Age: 75
End: 2025-02-20
Payer: MEDICARE

## 2025-02-26 ENCOUNTER — HOSPITAL ENCOUNTER (OUTPATIENT)
Facility: HOSPITAL | Age: 75
Setting detail: RECURRING SERIES
Discharge: HOME | End: 2025-03-01
Payer: MEDICARE

## 2025-02-26 PROCEDURE — 97110 THERAPEUTIC EXERCISES: CPT

## 2025-02-26 PROCEDURE — 97112 NEUROMUSCULAR REEDUCATION: CPT

## 2025-02-26 NOTE — PROGRESS NOTES
PHYSICAL / OCCUPATIONAL THERAPY - DAILY TREATMENT NOTE    Patient Name: Joanne Mills    Date: 2025    : 1950  Insurance: Payor: MEDICARE / Plan: MEDICARE PART A AND B / Product Type: *No Product type* /      Patient  verified Yes     Visit #   Current / Total 4 18   Time   In / Out 11:41 12:25   Pain   In / Out 1 2   Subjective Functional Status/Changes: Pt states she would like next visit to be her last.     TREATMENT AREA =  Left shoulder pain [M25.512]  Other displaced fracture of upper end of left humerus, initial encounter for closed fracture [S42.292A]     OBJECTIVE         Therapeutic Procedures:    Tx Min Billable or 1:1 Min (if diff from Tx Min) Procedure, Rationale, Specifics   27 17 57440 Therapeutic Exercise (timed):  increase ROM, strength, coordination, balance, and proprioception to improve patient's ability to progress to PLOF and address remaining functional goals. (see flow sheet as applicable)     Details if applicable:       17  43737 Neuromuscular Re-Education (timed):  improve balance, coordination, kinesthetic sense, posture, core stability and proprioception to improve patient's ability to develop conscious control of individual muscles and awareness of position of extremities in order to progress to PLOF and address remaining functional goals. (see flow sheet as applicable)     Details if applicable:            Details if applicable:            Details if applicable:            Details if applicable:     44 30 [facility] Totals Reminder: bill using total billable min of TIMED therapeutic procedures (example: do not include dry needle or estim unattended, both untimed codes, in totals to left)  8-22 min = 1 unit; 23-37 min = 2 units; 38-52 min = 3 units; 53-67 min = 4 units; 68-82 min = 5 units   Total Total     [x]  Patient Education billed concurrently with other procedures   [x] Review HEP    [] Progressed/Changed HEP, detail:    [] Other detail:       Objective

## 2025-02-28 ENCOUNTER — HOSPITAL ENCOUNTER (OUTPATIENT)
Facility: HOSPITAL | Age: 75
Setting detail: RECURRING SERIES
End: 2025-02-28
Payer: MEDICARE

## 2025-02-28 PROCEDURE — 97530 THERAPEUTIC ACTIVITIES: CPT

## 2025-02-28 PROCEDURE — 97535 SELF CARE MNGMENT TRAINING: CPT

## 2025-02-28 NOTE — PROGRESS NOTES
Physical Therapy Discharge Instructions      [facility]  [address] [place] [address] [phone] fax      Patient: Joanne Mills  : 1950      Continue Home Exercise Program 1-2 times per day for 5 weeks, then decrease to 3 times per week      Continue with    [] Ice  as needed  times per day     [] Heat           Follow up with MD:     [] Upon completion of therapy     [x] As needed      Recommendations:     [x]   Return to activity with home program    []   Return to activity with the following modifications:       []Post Rehab Program    []Join Independent aquatic program     []Return to/join local gym        Additional Comments:       Chantal Be, PTA 2025 12:49 PM    If an interpreting service was utilized for treatment of this patient, the contents of this document represent the material reviewed with the patient via the .

## 2025-02-28 NOTE — PROGRESS NOTES
PHYSICAL THERAPY - DISCHARGE DAILY NOTE AND SUMMARY (updated )    Patient Name: Joanne Mills : 1950   Treatment/Medical Diagnosis: Left shoulder pain [M25.512]  Other displaced fracture of upper end of left humerus, initial encounter for closed fracture [S42.292A]   Referral Source: Mumtaz Jaime,*     Payor: Payor: MEDICARE / Plan: MEDICARE PART A AND B / Product Type: *No Product type* /            Reporting Period : 2025 to 2025    Date: 2025    Patient  verified yes     Visit #   Current / Total 5 18   Time   In / Out 12:17 12:50   Pain   In / Out 1 1   Subjective Functional Status/Changes: I just need ex's that I can use to continue with increasing shoulder flexion     TREATMENT AREA =  Left shoulder pain [M25.512]  Other displaced fracture of upper end of left humerus, initial encounter for closed fracture [S42.292A]    OBJECTIVE      Therapeutic Procedures:  Tx Min Billable or 1:1 Min (if diff from Tx Min) Procedure, Rationale, Specifics   15 15 24423 Self Care/Home Management (timed):  improve patient knowledge and understanding of home injury/symptom/pain management  to improve patient's ability to progress to PLOF and address remaining functional goals.  (see flow sheet as applicable)     Details if applicable:  updated and reviewed HEP for symptom  management and ROM   18  07613 Therapeutic Activity (timed):  use of dynamic activities replicating functional movements to increase ROM, strength, coordination, balance, and proprioception in order to improve patient's ability to progress to PLOF and address remaining functional goals.  (see flow sheet as applicable)     Details if applicable:            Details if applicable:            Details if applicable:            Details if applicable:     33 33 [facility] Totals Reminder: bill using total billable min of TIMED therapeutic procedures (example: do not include dry needle or estim unattended, both untimed codes, in  totals to left)  8-22 min = 1 unit; 23-37 min = 2 units; 38-52 min = 3 units; 53-67 min = 4 units; 68-82 min = 5 units   Total Total     [x]  Patient Education billed concurrently with other procedures   [x] Review HEP    [] Progressed/Changed HEP, detail:    [] Other detail:       Summary of Care:  Pt will improve PROM left shoulder ER to 70 degs (in a scaption plane) to improve ability to tolerate activity progression.   Status at last note/certification:progressing: in 45 degrees abduction: 62 degrees of ER. (2/6/2025)  Current: Goal met: 80 degrees at 45 degrees abduction  (2/28/2025)   2.  Pt will increase AROM left shoulder flex/ABD to at least 120 degs to improve ability to reach overhead with less pain.  Status at last note/certification: progressing: in sitting. flexion 103 degrees, abduction 85 degrees both in absence of shoulder hike. In supine: 125 degrees flexion, 113 degrees abduction.  (2/6/2025)  Current: progressing: In supine: 135 degrees flexion, 118 degrees abduction. (2/11/2025) , not MET: Sitting: flexion: 100 degrees, abduction: 90 degrees. (2/28/2025)     3.  Pt will report being able to sleep in her bed with minimal to no increased left shoulder pain to improve sleeping tolerance.  Status at last note/certification: progressing: unable to lay on her left side: denies any pain when laying on back. (2/6/2025)  Current: status quo - unable to lay on her left side due to pain (2/13/2025), MET:  Pt now able to lay on her left side. And sleep through the night (2/12/2025)    Objective Information/Functional Measures/Assessment:   Pt attended 16 visits consistently and made consistent progress with skilled physical therapy services.  At time of last visit, Pt reported the following:  Functional Gains - donning/doffing socks, sleep on left side getting/out of tub.; Functional Deficits - limited shoulder flexion impacting ability to reach [place] shelves; and 80% improvement since start of care.  Pt has

## 2025-03-03 ENCOUNTER — TELEPHONE (OUTPATIENT)
Age: 75
End: 2025-03-03

## 2025-03-03 NOTE — TELEPHONE ENCOUNTER
Tried to call patient back to discuss the mail not arriving to her house yet, per patient's request.  Left voicemail.

## 2025-05-30 ENCOUNTER — HOSPITAL ENCOUNTER (OUTPATIENT)
Facility: HOSPITAL | Age: 75
Setting detail: RECURRING SERIES
End: 2025-05-30
Payer: MEDICARE

## 2025-05-30 PROCEDURE — 97161 PT EVAL LOW COMPLEX 20 MIN: CPT

## 2025-05-30 PROCEDURE — 97110 THERAPEUTIC EXERCISES: CPT

## 2025-05-30 PROCEDURE — 97535 SELF CARE MNGMENT TRAINING: CPT

## 2025-05-30 NOTE — PROGRESS NOTES
[facility]  [address] [place] [address] Phone: [phone] Fax [phone]  Plan of Care / Statement of Necessity for Physical Therapy Services     Patient Name: Joanne Mills : 1950   Treatment   Diagnosis: M25.552  LEFT HIP PAIN  Medical Diagnosis: Unilateral primary osteoarthritis, left hip [M16.12]   Onset Date: Chronic, order date 5/15/2025 Payor Source: Payor: MEDICARE / Plan: MEDICARE PART A AND B / Product Type: *No Product type* /    Referral Source: Camron Solano Jr., MD Start of Care (SOC): 2025   Prior Hospitalization: See medical history Provider #: [id number]   Prior Level of Function: Independent with ADLs, functional, and daily activities with chronic pain in the B hips.    Comorbidities: Musculoskeletal disorders and Other: osteoporosis, HTN, hx left shoulder pain/proximal humerus fracture 10/2024     Assessment / key information:    Pt is a 74 year old female who presents to therapy today with left hip pain. Pt states that her symptoms are chronic in nature. She is scheduled for left NILESH on 2025. She also has complaints of right hip pain and is supposed to have a right NILESH 3 months after the left. She presents to therapy today with her SPC and antalgic gait pattern. Pt demonstrated decreased AROM, decreased strength, impaired transfer ability, and impaired gait quality. Pt would benefit from skilled physical therapy to improve the above impairments to help the pt restore function and return to performing ADLs, functional and recreational activities.     Evaluation Complexity:  History:  MEDIUM  Complexity : 1-2 comorbidities / personal factors will impact the outcome/ POC ; Examination:  MEDIUM Complexity : 3 Standardized tests and measures addressin body structure, function, activity limitation and / or participation in recreation  ;Presentation:  LOW Complexity : Stable, uncomplicated

## 2025-05-30 NOTE — PROGRESS NOTES
PHYSICAL / OCCUPATIONAL THERAPY - DAILY TREATMENT NOTE    Patient Name: Joanne Mills    Date: 2025    : 1950  Insurance: Payor: MEDICARE / Plan: MEDICARE PART A AND B / Product Type: *No Product type* /      Patient  verified Yes     Visit #   Current / Total 1 24   Time   In / Out 10:22 11:07   Pain   In / Out 9 9   Subjective Functional Status/Changes: See POC     TREATMENT AREA =  Unilateral primary osteoarthritis, left hip    OBJECTIVE    19 min [x]Eval        X untimed      Therapeutic Procedures:    Tx Min Billable or 1:1 Min (if diff from Tx Min) Procedure, Rationale, Specifics   12  29529 Therapeutic Exercise (timed):  increase ROM, strength, coordination, balance, and proprioception to improve patient's ability to progress to PLOF and address remaining functional goals. (see flow sheet as applicable)     Details if applicable:  HEP instruction/demonstration     14  11626 Self Care/Home Management (timed):  improve patient knowledge and understanding of home injury/symptom/pain management, positioning, posture/ergonomics, home safety, activity modification, transfer techniques, and joint protection strategies  to improve patient's ability to progress to PLOF and address remaining functional goals.  (see flow sheet as applicable)     Details if applicable:  Pt education on relevant anatomy/physiology, pt education on therapy progression and expectations.     26  [facility] Totals Reminder: bill using total billable min of TIMED therapeutic procedures (example: do not include dry needle or estim unattended, both untimed codes, in totals to left)  8-22 min = 1 unit; 23-37 min = 2 units; 38-52 min = 3 units; 53-67 min = 4 units; 68-82 min = 5 units   Total Total     Charge Capture    [x]  Patient Education billed concurrently with other procedures   [x] Review HEP    [] Progressed/Changed HEP, detail:    [] Other detail:       Objective Information/Functional Measures/Assessment  See

## 2025-06-05 ENCOUNTER — HOSPITAL ENCOUNTER (OUTPATIENT)
Facility: HOSPITAL | Age: 75
Setting detail: RECURRING SERIES
Discharge: HOME | End: 2025-06-08
Payer: MEDICARE

## 2025-06-05 PROCEDURE — 97530 THERAPEUTIC ACTIVITIES: CPT

## 2025-06-05 PROCEDURE — 97110 THERAPEUTIC EXERCISES: CPT

## 2025-06-05 NOTE — PROGRESS NOTES
PHYSICAL / OCCUPATIONAL THERAPY - DAILY TREATMENT NOTE    Patient Name: Joanne Mills    Date: 2025    : 1950  Insurance: Payor: MEDICARE / Plan: MEDICARE PART A AND B / Product Type: *No Product type* /      Patient  verified Yes     Visit #   Current / Total 2 24   Time   In / Out 11:00 11:40   Pain   In / Out 8 7   Subjective Functional Status/Changes: I do as much as I can  but the pain is interfering     TREATMENT AREA =  Unilateral primary osteoarthritis, left hip    OBJECTIVE      Therapeutic Procedures:    Tx Min Billable or 1:1 Min (if diff from Tx Min) Procedure, Rationale, Specifics    00008 Therapeutic Exercise (timed):  increase ROM, strength, coordination, balance, and proprioception to improve patient's ability to progress to PLOF and address remaining functional goals. (see flow sheet as applicable)     Details if applicable:        53466 Therapeutic Activity (timed):  use of dynamic activities replicating functional movements to increase ROM, strength, coordination, balance, and proprioception in order to improve patient's ability to progress to PLOF and address remaining functional goals.  (see flow sheet as applicable)     Details if applicable:            Details if applicable:            Details if applicable:            Details if applicable:     40  [facility] Totals Reminder: bill using total billable min of TIMED therapeutic procedures (example: do not include dry needle or estim unattended, both untimed codes, in totals to left)  8-22 min = 1 unit; 23-37 min = 2 units; 38-52 min = 3 units; 53-67 min = 4 units; 68-82 min = 5 units   Total Total     Charge Capture    [x]  Patient Education billed concurrently with other procedures   [x] Review HEP    [] Progressed/Changed HEP, detail:    [] Other detail:       Objective Information/Functional Measures/Assessment    Patient seen for for first follow-up after evaluation to address Unilateral primary osteoarthritis, left

## 2025-06-10 ENCOUNTER — HOSPITAL ENCOUNTER (OUTPATIENT)
Facility: HOSPITAL | Age: 75
Setting detail: RECURRING SERIES
Discharge: HOME | End: 2025-06-13
Payer: MEDICARE

## 2025-06-10 PROCEDURE — 97530 THERAPEUTIC ACTIVITIES: CPT

## 2025-06-10 PROCEDURE — 97110 THERAPEUTIC EXERCISES: CPT

## 2025-06-10 NOTE — PROGRESS NOTES
PHYSICAL / OCCUPATIONAL THERAPY - DAILY TREATMENT NOTE    Patient Name: Joanne Mills    Date: 6/10/2025    : 1950  Insurance: Payor: MEDICARE / Plan: MEDICARE PART A AND B / Product Type: *No Product type* /      Patient  verified Yes     Visit #   Current / Total 3 24   Time   In / Out 8:22 9:00   Pain   In / Out 8 8   Subjective Functional Status/Changes: The medication takes the edge off but it doesn't take it away.     TREATMENT AREA =  Unilateral primary osteoarthritis, left hip    OBJECTIVE      Therapeutic Procedures:    Tx Min Billable or 1:1 Min (if diff from Tx Min) Procedure, Rationale, Specifics   25 25 [id number] Therapeutic Exercise (timed):  increase ROM, strength, coordination, balance, and proprioception to improve patient's ability to progress to PLOF and address remaining functional goals. (see flow sheet as applicable)     Details if applicable:       13 13 03161 Therapeutic Activity (timed):  use of dynamic activities replicating functional movements to increase ROM, strength, coordination, balance, and proprioception in order to improve patient's ability to progress to PLOF and address remaining functional goals.  (see flow sheet as applicable)     Details if applicable:            Details if applicable:            Details if applicable:            Details if applicable:     38 38 [facility] Totals Reminder: bill using total billable min of TIMED therapeutic procedures (example: do not include dry needle or estim unattended, both untimed codes, in totals to left)  8-22 min = 1 unit; 23-37 min = 2 units; 38-52 min = 3 units; 53-67 min = 4 units; 68-82 min = 5 units   Total Total     Charge Capture    [x]  Patient Education billed concurrently with other procedures   [x] Review HEP    [] Progressed/Changed HEP, detail:    [] Other detail:       Objective Information/Functional Measures/Assessment    Patient seen today to address Unilateral primary osteoarthritis, left hip.  Progressed ex's

## 2025-06-13 ENCOUNTER — HOSPITAL ENCOUNTER (OUTPATIENT)
Facility: HOSPITAL | Age: 75
Setting detail: RECURRING SERIES
Discharge: HOME | End: 2025-06-16
Payer: MEDICARE

## 2025-06-13 PROCEDURE — 97112 NEUROMUSCULAR REEDUCATION: CPT

## 2025-06-13 PROCEDURE — 97110 THERAPEUTIC EXERCISES: CPT

## 2025-06-13 NOTE — PROGRESS NOTES
PHYSICAL / OCCUPATIONAL THERAPY - DAILY TREATMENT NOTE    Patient Name: Joanne Mills    Date: 2025    : 1950  Insurance: Payor: MEDICARE / Plan: MEDICARE PART A AND B / Product Type: *No Product type* /      Patient  verified Yes     Visit #   Current / Total 4 24   Time   In / Out 12:23 1:01   Pain   In / Out 8/10 7/10   Subjective Functional Status/Changes: My surgery is still set for . The pain starts of bad at the start of the day and gets better as the day progresses.      TREATMENT AREA =  Unilateral primary osteoarthritis, left hip    OBJECTIVE         Therapeutic Procedures:    Tx Min Billable or 1:1 Min (if diff from Tx Min) Procedure, Rationale, Specifics   23 23 02319 Therapeutic Exercise (timed):  increase ROM, strength, coordination, balance, and proprioception to improve patient's ability to progress to PLOF and address remaining functional goals. (see flow sheet as applicable)     Details if applicable:  to include sit to stands.     15 15 75634 Neuromuscular Re-Education (timed):  improve balance, coordination, kinesthetic sense, posture, core stability and proprioception to improve patient's ability to develop conscious control of individual muscles and awareness of position of extremities in order to progress to PLOF and address remaining functional goals. (see flow sheet as applicable)     Details if applicable:                    38 38 [facility] Totals Reminder: bill using total billable min of TIMED therapeutic procedures (example: do not include dry needle or estim unattended, both untimed codes, in totals to left)  8-22 min = 1 unit; 23-37 min = 2 units; 38-52 min = 3 units; 53-67 min = 4 units; 68-82 min = 5 units   Total Total     Charge Capture    [x]  Patient Education billed concurrently with other procedures   [x] Review HEP    [] Progressed/Changed HEP, detail:    [] Other detail:       Objective Information/Functional Measures/Assessment    Session started

## 2025-06-17 ENCOUNTER — HOSPITAL ENCOUNTER (OUTPATIENT)
Facility: HOSPITAL | Age: 75
Setting detail: RECURRING SERIES
Discharge: HOME | End: 2025-06-20
Payer: MEDICARE

## 2025-06-17 PROCEDURE — 97530 THERAPEUTIC ACTIVITIES: CPT

## 2025-06-17 PROCEDURE — 97110 THERAPEUTIC EXERCISES: CPT

## 2025-06-17 NOTE — PROGRESS NOTES
PHYSICAL / OCCUPATIONAL THERAPY - DAILY TREATMENT NOTE    Patient Name: Joanne Mills    Date: 2025    : 1950  Insurance: Payor: MEDICARE / Plan: MEDICARE PART A AND B / Product Type: *No Product type* /      Patient  verified Yes     Visit #   Current / Total 5 24   Time   In / Out 11:02 am 11:43 am   Pain   In / Out 8/10 7/10   Subjective Functional Status/Changes: \"I still have a lot of pain in my hips.\"     TREATMENT AREA =  Unilateral primary osteoarthritis, left hip    OBJECTIVE         Therapeutic Procedures:    Tx Min Billable or 1:1 Min (if diff from Tx Min) Procedure, Rationale, Specifics   29  39369 Therapeutic Exercise (timed):  increase ROM, strength, coordination, balance, and proprioception to improve patient's ability to progress to PLOF and address remaining functional goals. (see flow sheet as applicable)     Details if applicable:        14669 Therapeutic Activity (timed):  use of dynamic activities replicating functional movements to increase ROM, strength, coordination, balance, and proprioception in order to improve patient's ability to progress to PLOF and address remaining functional goals.  (see flow sheet as applicable)     Details if applicable:                    41 31 [facility] Totals Reminder: bill using total billable min of TIMED therapeutic procedures (example: do not include dry needle or estim unattended, both untimed codes, in totals to left)  8-22 min = 1 unit; 23-37 min = 2 units; 38-52 min = 3 units; 53-67 min = 4 units; 68-82 min = 5 units   Total Total     Charge Capture    [x]  Patient Education billed concurrently with other procedures   [x] Review HEP    [] Progressed/Changed HEP, detail:    [] Other detail:       Objective Information/Functional Measures/Assessment    Continued with exercises per flow sheet. Initiated march with use of Tband but Pt unable to tolerate from pain, but completed exercises at decreased height.  Pt reports improved hip

## 2025-06-26 ENCOUNTER — HOSPITAL ENCOUNTER (OUTPATIENT)
Facility: HOSPITAL | Age: 75
Setting detail: RECURRING SERIES
Discharge: HOME | End: 2025-06-29
Payer: MEDICARE

## 2025-06-26 PROCEDURE — 97530 THERAPEUTIC ACTIVITIES: CPT

## 2025-06-26 PROCEDURE — 97112 NEUROMUSCULAR REEDUCATION: CPT

## 2025-06-26 NOTE — PROGRESS NOTES
[facility]   [address], [place] [address]  Phone: [phone] Fax: [phone]  PROGRESS NOTE  Patient Name: Joanne Mills : 1950   Treatment/Medical Diagnosis: Unilateral primary osteoarthritis, left hip   Referral Source: Camron Solano Jr., MD     Payor: Payor: MEDICARE / Plan: MEDICARE PART A AND B / Product Type: *No Product type* /            Date of Initial Visit: 2025 Attended Visits: 27 Missed Visits: 1       CURRENT GOAL STATUS  Short Term Goals: To be accomplished in 8 treatment   Pt will report compliance and independence to HEP to help the pt manage their pain and symptoms.  Status at last note/certification:  established  Current: met - Pt compliant with HEP (6/10/2025)  2.     Pt will report an improvement in at best pain to 4/10 to improve ease of weight bearing with household activities.  Status at last note/certification: 7/10 at best  Current: not met - 7/10 at best  (2025)     Long Term Goals: To be accomplished in 24 treatments  Pt will improve her LEFS score to 40/80 points to improve ability to perform ADLs.  Status at last note/certification: 26/80 points  Current: progressin/80 pts. (2025)  2.  Pt will increase AROM B hip flex to 115 degs (in hooklying), B hip ABD to 25 degs (in supine) to improve ability to tolerate bed mobility/transfers.  Status at last note/certification:                                          AROM                                          Hip Left Right   Flexion 105 degs with hip ER compensation 106 degs   Abduction 13 degs 15 degs     Current: progressing: see below (2025)  Hip Left Right   Flexion 105 degs with hip ER compensation 1110 degs   Abduction 18 degs 20 degs     3.  Pt will increase AROM B hip IR to 15 degs, right hip ER to 25 degs, left hip ER to 20 degs (all in sitting) to improve ability to tolerate functional activities in the

## 2025-06-26 NOTE — PROGRESS NOTES
PHYSICAL / OCCUPATIONAL THERAPY - DAILY TREATMENT NOTE    Patient Name: Joanne Mills    Date: 2025    : 1950  Insurance: Payor: MEDICARE / Plan: MEDICARE PART A AND B / Product Type: *No Product type* /      Patient  verified Yes     Visit #   Current / Total 6 24   Time   In / Out 9:41 10:21   Pain   In / Out 7/10 7/10   Subjective Functional Status/Changes: My right hip is aggravating me more today, even though my      TREATMENT AREA =  Unilateral primary osteoarthritis, left hip    OBJECTIVE         Therapeutic Procedures:    Tx Min Billable or 1:1 Min (if diff from Tx Min) Procedure, Rationale, Specifics   15 15 89076 Therapeutic Activity (timed):  use of dynamic activities replicating functional movements to increase ROM, strength, coordination, balance, and proprioception in order to improve patient's ability to progress to PLOF and address remaining functional goals.  (see flow sheet as applicable)     Details if applicable:  goal assessment.      25 25 48278 Neuromuscular Re-Education (timed):  improve balance, coordination, kinesthetic sense, posture, core stability and proprioception to improve patient's ability to develop conscious control of individual muscles and awareness of position of extremities in order to progress to PLOF and address remaining functional goals. (see flow sheet as applicable)     Details if applicable:                    40 40 [facility] Totals Reminder: bill using total billable min of TIMED therapeutic procedures (example: do not include dry needle or estim unattended, both untimed codes, in totals to left)  8-22 min = 1 unit; 23-37 min = 2 units; 38-52 min = 3 units; 53-67 min = 4 units; 68-82 min = 5 units   Total Total     Charge Capture    [x]  Patient Education billed concurrently with other procedures   [x] Review HEP    [] Progressed/Changed HEP, detail:    [] Other detail:       Objective Information/Functional Measures/Assessment    Pt has attended skilled

## 2025-07-03 ENCOUNTER — HOSPITAL ENCOUNTER (OUTPATIENT)
Facility: HOSPITAL | Age: 75
Setting detail: RECURRING SERIES
Discharge: HOME | End: 2025-07-06
Payer: MEDICARE

## 2025-07-03 PROCEDURE — 97530 THERAPEUTIC ACTIVITIES: CPT

## 2025-07-03 PROCEDURE — 97110 THERAPEUTIC EXERCISES: CPT

## 2025-07-03 NOTE — PROGRESS NOTES
for support, bilateral antalgic gait noted.    Patient will continue to benefit from skilled PT / OT services to modify and progress therapeutic interventions, analyze and address functional mobility deficits, analyze and address ROM deficits, analyze and address strength deficits, analyze and address soft tissue restrictions, analyze and cue for proper movement patterns, analyze and modify for postural abnormalities, analyze and address imbalance/dizziness, and instruct in home and community integration to address functional deficits and attain remaining goals.    Progress toward goals / Updated goals:  []  See Progress Note/Recertification    1.     Pt will report an improvement in at best pain to 4/10 to improve ease of weight bearing with household activities.  Status at last note/certification: Not met: 7/10 at best  (2025)  Current: progressin/10 today  (7/3/2025)     Pt will improve her LEFS score to 40/80 points to improve ability to perform ADLs.  Status at last note/certification: slight progress: 28/80 pts. (2025)  Current:      3.  Pt will increase AROM B hip flex to 115 degs (in hooklying), B hip ABD to 25 degs (in supine) to improve ability to tolerate bed mobility/transfers.  Status at last note/certification:progressing: (2025)  Hip Left Right   Flexion 105 degs with hip ER compensation 110 degs   Abduction 18 degs 20 degs      Current:      4.  Pt will increase AROM B hip IR to 15 degs, right hip ER to 25 degs, left hip ER to 20 degs (all in sitting) to improve ability to tolerate functional activities in the community.  Status at last note/certification: progressing: see below (2025)  AROM                                          Hip Left Right   ER 25 degs 30 degs   IR 12 degs 15 degs      Current:      5.  Pt will report being able to walk between rooms with a little bit of difficulty due to her hip pain to improve safety with household mobility/ambulation.  Status at last

## 2025-07-07 ENCOUNTER — HOSPITAL ENCOUNTER (OUTPATIENT)
Facility: HOSPITAL | Age: 75
Setting detail: RECURRING SERIES
Discharge: HOME | End: 2025-07-10
Payer: MEDICARE

## 2025-07-07 PROCEDURE — 97110 THERAPEUTIC EXERCISES: CPT

## 2025-07-07 PROCEDURE — 97530 THERAPEUTIC ACTIVITIES: CPT

## 2025-07-07 NOTE — PROGRESS NOTES
PHYSICAL / OCCUPATIONAL THERAPY - DAILY TREATMENT NOTE    Patient Name: Joanne Mills    Date: 2025    : 1950  Insurance: Payor: MEDICARE / Plan: MEDICARE PART A AND B / Product Type: *No Product type* /      Patient  verified Yes     Visit #   Current / Total 2 17   Time   In / Out 12:23 12:55   Pain   In / Out 7 6   Subjective Functional Status/Changes:      TREATMENT AREA =  Unilateral primary osteoarthritis, left hip    OBJECTIVE      Therapeutic Procedures:    Tx Min Billable or 1:1 Min (if diff from Tx Min) Procedure, Rationale, Specifics   14 14 [id number] Therapeutic Exercise (timed):  increase ROM, strength, coordination, balance, and proprioception to improve patient's ability to progress to PLOF and address remaining functional goals. (see flow sheet as applicable)     Details if applicable:       18 18 28072 Therapeutic Activity (timed):  use of dynamic activities replicating functional movements to increase ROM, strength, coordination, balance, and proprioception in order to improve patient's ability to progress to PLOF and address remaining functional goals.  (see flow sheet as applicable)     Details if applicable:            Details if applicable:            Details if applicable:            Details if applicable:     32 32 [facility] Totals Reminder: bill using total billable min of TIMED therapeutic procedures (example: do not include dry needle or estim unattended, both untimed codes, in totals to left)  8-22 min = 1 unit; 23-37 min = 2 units; 38-52 min = 3 units; 53-67 min = 4 units; 68-82 min = 5 units   Total Total     Charge Capture    [x]  Patient Education billed concurrently with other procedures   [x] Review HEP    [] Progressed/Changed HEP, detail:    [] Other detail:       Objective Information/Functional Measures/Assessment    Patient seen today to address Unilateral primary osteoarthritis, left hip.  Continuing to progress program as she performed increased reps.  Uses SPC for

## 2025-07-10 ENCOUNTER — HOSPITAL ENCOUNTER (OUTPATIENT)
Facility: HOSPITAL | Age: 75
Setting detail: RECURRING SERIES
Discharge: HOME | End: 2025-07-13
Payer: MEDICARE

## 2025-07-10 PROCEDURE — 97530 THERAPEUTIC ACTIVITIES: CPT

## 2025-07-10 PROCEDURE — 97110 THERAPEUTIC EXERCISES: CPT

## 2025-07-10 NOTE — PROGRESS NOTES
PHYSICAL / OCCUPATIONAL THERAPY - DAILY TREATMENT NOTE    Patient Name: Joanne Mills    Date: 7/10/2025    : 1950  Insurance: Payor: MEDICARE / Plan: MEDICARE PART A AND B / Product Type: *No Product type* /      Patient  verified Yes     Visit #   Current / Total 3 17   Time   In / Out 2:45 3:20   Pain   In / Out 7 6   Subjective Functional Status/Changes: \"I'm worse when I sit still\"      TREATMENT AREA =  Unilateral primary osteoarthritis, left hip    OBJECTIVE      Therapeutic Procedures:    Tx Min Billable or 1:1 Min (if diff from Tx Min) Procedure, Rationale, Specifics   15 15 76212 Therapeutic Exercise (timed):  increase ROM, strength, coordination, balance, and proprioception to improve patient's ability to progress to PLOF and address remaining functional goals. (see flow sheet as applicable)     Details if applicable:        Therapeutic Activity (timed):  use of dynamic activities replicating functional movements to increase ROM, strength, coordination, balance, and proprioception in order to improve patient's ability to progress to PLOF and address remaining functional goals.  (see flow sheet as applicable)     Details if applicable:            Details if applicable:            Details if applicable:            Details if applicable:     35 35 [facility] Totals Reminder: bill using total billable min of TIMED therapeutic procedures (example: do not include dry needle or estim unattended, both untimed codes, in totals to left)  8-22 min = 1 unit; 23-37 min = 2 units; 38-52 min = 3 units; 53-67 min = 4 units; 68-82 min = 5 units   Total Total     Charge Capture    [x]  Patient Education billed concurrently with other procedures   [x] Review HEP    [] Progressed/Changed HEP, detail:    [] Other detail:       Objective Information/Functional Measures/Assessment    Patient seen today to address Unilateral primary osteoarthritis, left hip.  Introduced standing hip lateral  and posterior

## 2025-07-11 ENCOUNTER — APPOINTMENT (OUTPATIENT)
Facility: HOSPITAL | Age: 75
End: 2025-07-11
Payer: MEDICARE

## 2025-07-18 ENCOUNTER — TRANSCRIBE ORDERS (OUTPATIENT)
Facility: HOSPITAL | Age: 75
End: 2025-07-18

## 2025-07-18 DIAGNOSIS — R01.1 CARDIAC MURMUR: Primary | ICD-10-CM

## 2025-07-23 ENCOUNTER — HOSPITAL ENCOUNTER (OUTPATIENT)
Facility: HOSPITAL | Age: 75
Setting detail: RECURRING SERIES
Discharge: HOME | End: 2025-07-26
Payer: MEDICARE

## 2025-07-23 PROCEDURE — 97530 THERAPEUTIC ACTIVITIES: CPT

## 2025-07-23 PROCEDURE — 97110 THERAPEUTIC EXERCISES: CPT

## 2025-07-23 NOTE — PROGRESS NOTES
Hip Left Right   ER 23 degs 30 degs   IR 18 degs 12 degs        5.  Pt will report being able to walk between rooms with a little bit of difficulty due to her hip pain to improve safety with household mobility/ambulation.  Status at last note/certification:  not met - still quite a bit of difficulty walking between rooms but eases throughout the day, with use of SPC (25)  Current: MET: Greater ease with household ambulation, will use walls for added support. (2025)    SUMMARY OF TREATMENT  Pt has attended skilled physical therapy for 10 visits to address Unilateral primary osteoarthritis, left hip [M16.12]  Pain in left hip [M25.552] and has made steady progress thus far with therapy measures. Objectively, Pt demonstrates improvements in functional LE ROM.  Pt's functional gains include greater ease with ambulating community distances as well as a slight reduction in overall symptoms.  Pt's functional deficits include continued dependence on her SPC whenever ambulating, inability to negotiate multiple flights of stairs, and increased symptoms with lifting/ squatting tasks.  Pt rates pain as 7/10 at worst and 3-4/10 at best, with symptoms being primarily in both inguinal creases.  Pt reports a self-reported improvement rating of 60% since start of care. Pt is currently set to receive a left NILESH on 2025.  Pt would continue to benefit from skilled therapy services to prepare for upcoming NILESH.      Primary mitigating factor which impeded progress:  None of these apply from preset list (refer to note for other details)      Medicare, cannot change goals, cannot adjust frequency/duration, no signature required   Reporting Period: (date from last Prog Note/Eval to current Prog Note/Recert)  2025 - 2025    Long Term Goals: To be accomplished in 12 treatments    Pt will improve her LEFS score to 40/80 points to improve ability to perform ADLs.  Status at last note/certification: progressin80 
postural abnormalities to address functional deficits and attain remaining goals.    Progress toward goals / Updated goals:  []  See Progress Note/Recertification    Long Term Goals: To be accomplished in 12 treatments     Pt will improve her LEFS score to 40/80 points to improve ability to perform ADLs.  Status at last note/certification: progressin/80 pts. (25)  Current:      2.  Pt will increase AROM B hip flex to 115 degs (in hooklying), B hip ABD to 25 degs (in supine) to improve ability to tolerate bed mobility/transfers.  Status at last note/certification:slight progress. (2025)     Hip Left Right   Flexion 103 degs with minimal hip ER compensation 110 degs   Abduction 20 degs 20 degs      Current:      3.  Pt will increase AROM B hip IR to 15 degs, right hip ER to 25 degs, left hip ER to 20 degs (all in sitting) to improve ability to tolerate functional activities in the community.  Status at last note/certification: slight progress: (2025) AROM                                          Hip Left Right   ER 23 degs 30 degs   IR 18 degs 12 degs         Current:      Next PN/ RC due 2025  Auth due (visit number/ date) BERNADETTE    PLAN  - Continue Plan of Care    Elroy Carlene, PTA    2025    3:05 PM  If an interpreting service was utilized for treatment of this patient, the contents of this document represent the material reviewed with the patient via the .     Future Appointments   Date Time Provider Department Center   2025 11:40 AM Jamison Gaspar, PT [email or web address]

## 2025-07-29 ENCOUNTER — HOSPITAL ENCOUNTER (OUTPATIENT)
Facility: HOSPITAL | Age: 75
Setting detail: RECURRING SERIES
Discharge: HOME | End: 2025-08-01
Payer: MEDICARE

## 2025-07-29 PROCEDURE — 97110 THERAPEUTIC EXERCISES: CPT

## 2025-07-29 PROCEDURE — 97530 THERAPEUTIC ACTIVITIES: CPT

## 2025-07-29 NOTE — PROGRESS NOTES
PHYSICAL / OCCUPATIONAL THERAPY - DAILY TREATMENT NOTE    Patient Name: Joanne Mills    Date: 2025    : 1950  Insurance: Payor: MEDICARE / Plan: MEDICARE PART A AND B / Product Type: *No Product type* /      Patient  verified Yes     Visit #   Current / Total 1 12   Time   In / Out 11:41 12:19   Pain   In / Out 4 5   Subjective Functional Status/Changes: \"My surgery is on  for my left hip.\"      TREATMENT AREA =  Unilateral primary osteoarthritis, left hip    OBJECTIVE     Therapeutic Procedures:    Tx Min Billable or 1:1 Min (if diff from Tx Min) Procedure, Rationale, Specifics   23  74270 Therapeutic Exercise (timed):  increase ROM, strength, coordination, balance, and proprioception to improve patient's ability to progress to PLOF and address remaining functional goals. (see flow sheet as applicable)     Details if applicable:  H/L hip abd/add, standing HR/TR, LAQ, supine hip abduction, BKFO     15  70340 Therapeutic Activity (timed):  use of dynamic activities replicating functional movements to increase ROM, strength, coordination, balance, and proprioception in order to improve patient's ability to progress to PLOF and address remaining functional goals.  (see flow sheet as applicable)     Details if applicable:  stationary bike, bridge, minisquat, supine heel slides   38  [facility] BC Totals Reminder: bill using total billable min of TIMED therapeutic procedures (example: do not include dry needle or estim unattended, both untimed codes, in totals to left)  8-22 min = 1 unit; 23-37 min = 2 units; 38-52 min = 3 units; 53-67 min = 4 units; 68-82 min = 5 units   Total Total     Charge Capture    [x]  Patient Education billed concurrently with other procedures   [x] Review HEP    [] Progressed/Changed HEP, detail:    [] Other detail:       Objective Information/Functional Measures/Assessment    Exercises per flowsheet.    Audible popping in B hips/knees during .     Patient

## 2025-08-01 ENCOUNTER — HOSPITAL ENCOUNTER (OUTPATIENT)
Facility: HOSPITAL | Age: 75
Setting detail: RECURRING SERIES
Discharge: HOME | End: 2025-08-04
Payer: MEDICARE

## 2025-08-01 PROCEDURE — 97530 THERAPEUTIC ACTIVITIES: CPT

## 2025-08-01 PROCEDURE — 97110 THERAPEUTIC EXERCISES: CPT

## 2025-08-04 ENCOUNTER — HOSPITAL ENCOUNTER (OUTPATIENT)
Facility: HOSPITAL | Age: 75
Setting detail: RECURRING SERIES
Discharge: HOME | End: 2025-08-07
Payer: MEDICARE

## 2025-08-04 PROCEDURE — 97110 THERAPEUTIC EXERCISES: CPT

## 2025-08-04 PROCEDURE — 97530 THERAPEUTIC ACTIVITIES: CPT

## 2025-08-07 ENCOUNTER — APPOINTMENT (OUTPATIENT)
Facility: HOSPITAL | Age: 75
End: 2025-08-07
Payer: MEDICARE